# Patient Record
Sex: MALE | Race: WHITE | NOT HISPANIC OR LATINO | URBAN - METROPOLITAN AREA
[De-identification: names, ages, dates, MRNs, and addresses within clinical notes are randomized per-mention and may not be internally consistent; named-entity substitution may affect disease eponyms.]

---

## 2019-09-15 ENCOUNTER — EMERGENCY (EMERGENCY)
Facility: HOSPITAL | Age: 19
LOS: 1 days | Discharge: HOME | End: 2019-09-15
Attending: EMERGENCY MEDICINE
Payer: COMMERCIAL

## 2019-09-15 VITALS
SYSTOLIC BLOOD PRESSURE: 134 MMHG | HEART RATE: 94 BPM | DIASTOLIC BLOOD PRESSURE: 79 MMHG | TEMPERATURE: 99 F | WEIGHT: 169.98 LBS | HEIGHT: 73 IN | OXYGEN SATURATION: 97 % | RESPIRATION RATE: 20 BRPM

## 2019-09-15 LAB
ALBUMIN SERPL ELPH-MCNC: 4.4 G/DL — SIGNIFICANT CHANGE UP (ref 3.3–5)
ALP SERPL-CCNC: 93 U/L — SIGNIFICANT CHANGE UP (ref 40–120)
ALT FLD-CCNC: 115 U/L — HIGH (ref 10–45)
ANION GAP SERPL CALC-SCNC: 13 MMOL/L — SIGNIFICANT CHANGE UP (ref 5–17)
APPEARANCE UR: CLEAR — SIGNIFICANT CHANGE UP
AST SERPL-CCNC: 61 U/L — HIGH (ref 10–40)
BACTERIA # UR AUTO: NEGATIVE — SIGNIFICANT CHANGE UP
BILIRUB SERPL-MCNC: 0.5 MG/DL — SIGNIFICANT CHANGE UP (ref 0.2–1.2)
BILIRUB UR-MCNC: NEGATIVE — SIGNIFICANT CHANGE UP
BUN SERPL-MCNC: 14 MG/DL — SIGNIFICANT CHANGE UP (ref 7–23)
CALCIUM SERPL-MCNC: 10.1 MG/DL — SIGNIFICANT CHANGE UP (ref 8.4–10.5)
CHLORIDE SERPL-SCNC: 102 MMOL/L — SIGNIFICANT CHANGE UP (ref 96–108)
CO2 SERPL-SCNC: 23 MMOL/L — SIGNIFICANT CHANGE UP (ref 22–31)
COLOR SPEC: YELLOW — SIGNIFICANT CHANGE UP
CREAT SERPL-MCNC: 1.11 MG/DL — SIGNIFICANT CHANGE UP (ref 0.5–1.3)
DIFF PNL FLD: NEGATIVE — SIGNIFICANT CHANGE UP
EPI CELLS # UR: 1 /HPF — SIGNIFICANT CHANGE UP
GLUCOSE SERPL-MCNC: 89 MG/DL — SIGNIFICANT CHANGE UP (ref 70–99)
GLUCOSE UR QL: NEGATIVE — SIGNIFICANT CHANGE UP
HCT VFR BLD CALC: 44.6 % — SIGNIFICANT CHANGE UP (ref 39–50)
HGB BLD-MCNC: 15.1 G/DL — SIGNIFICANT CHANGE UP (ref 13–17)
HYALINE CASTS # UR AUTO: 0 /LPF — SIGNIFICANT CHANGE UP (ref 0–2)
KETONES UR-MCNC: NEGATIVE — SIGNIFICANT CHANGE UP
LEUKOCYTE ESTERASE UR-ACNC: NEGATIVE — SIGNIFICANT CHANGE UP
MCHC RBC-ENTMCNC: 29.4 PG — SIGNIFICANT CHANGE UP (ref 27–34)
MCHC RBC-ENTMCNC: 33.9 GM/DL — SIGNIFICANT CHANGE UP (ref 32–36)
MCV RBC AUTO: 86.7 FL — SIGNIFICANT CHANGE UP (ref 80–100)
NITRITE UR-MCNC: NEGATIVE — SIGNIFICANT CHANGE UP
PH UR: 6 — SIGNIFICANT CHANGE UP (ref 5–8)
PLATELET # BLD AUTO: 268 K/UL — SIGNIFICANT CHANGE UP (ref 150–400)
POTASSIUM SERPL-MCNC: 4.2 MMOL/L — SIGNIFICANT CHANGE UP (ref 3.5–5.3)
POTASSIUM SERPL-SCNC: 4.2 MMOL/L — SIGNIFICANT CHANGE UP (ref 3.5–5.3)
PROT SERPL-MCNC: 8.3 G/DL — SIGNIFICANT CHANGE UP (ref 6–8.3)
PROT UR-MCNC: ABNORMAL
RBC # BLD: 5.14 M/UL — SIGNIFICANT CHANGE UP (ref 4.2–5.8)
RBC # FLD: 11.6 % — SIGNIFICANT CHANGE UP (ref 10.3–14.5)
RBC CASTS # UR COMP ASSIST: 1 /HPF — SIGNIFICANT CHANGE UP (ref 0–4)
S PYO AG SPEC QL IA: NEGATIVE — SIGNIFICANT CHANGE UP
SODIUM SERPL-SCNC: 138 MMOL/L — SIGNIFICANT CHANGE UP (ref 135–145)
SP GR SPEC: 1.05 — HIGH (ref 1.01–1.02)
UROBILINOGEN FLD QL: NEGATIVE — SIGNIFICANT CHANGE UP
WBC # BLD: 15.8 K/UL — HIGH (ref 3.8–10.5)
WBC # FLD AUTO: 15.8 K/UL — HIGH (ref 3.8–10.5)
WBC UR QL: 3 /HPF — SIGNIFICANT CHANGE UP (ref 0–5)

## 2019-09-15 PROCEDURE — 70491 CT SOFT TISSUE NECK W/DYE: CPT | Mod: 26

## 2019-09-15 PROCEDURE — 99218: CPT

## 2019-09-15 RX ORDER — DEXAMETHASONE 0.5 MG/5ML
10 ELIXIR ORAL EVERY 8 HOURS
Refills: 0 | Status: COMPLETED | OUTPATIENT
Start: 2019-09-15 | End: 2019-09-16

## 2019-09-15 RX ORDER — SODIUM CHLORIDE 9 MG/ML
1000 INJECTION INTRAMUSCULAR; INTRAVENOUS; SUBCUTANEOUS
Refills: 0 | Status: DISCONTINUED | OUTPATIENT
Start: 2019-09-15 | End: 2019-09-19

## 2019-09-15 RX ORDER — ACETAMINOPHEN 500 MG
650 TABLET ORAL ONCE
Refills: 0 | Status: COMPLETED | OUTPATIENT
Start: 2019-09-15 | End: 2019-09-15

## 2019-09-15 RX ORDER — SODIUM CHLORIDE 9 MG/ML
1000 INJECTION INTRAMUSCULAR; INTRAVENOUS; SUBCUTANEOUS ONCE
Refills: 0 | Status: COMPLETED | OUTPATIENT
Start: 2019-09-15 | End: 2019-09-15

## 2019-09-15 RX ADMIN — Medication 102 MILLIGRAM(S): at 15:52

## 2019-09-15 RX ADMIN — Medication 100 MILLIGRAM(S): at 14:32

## 2019-09-15 RX ADMIN — Medication 125 MILLIGRAM(S): at 14:42

## 2019-09-15 RX ADMIN — SODIUM CHLORIDE 125 MILLILITER(S): 9 INJECTION INTRAMUSCULAR; INTRAVENOUS; SUBCUTANEOUS at 22:15

## 2019-09-15 RX ADMIN — Medication 100 MILLIGRAM(S): at 22:13

## 2019-09-15 RX ADMIN — Medication 650 MILLIGRAM(S): at 14:42

## 2019-09-15 RX ADMIN — SODIUM CHLORIDE 1000 MILLILITER(S): 9 INJECTION INTRAMUSCULAR; INTRAVENOUS; SUBCUTANEOUS at 12:18

## 2019-09-15 NOTE — ED CDU PROVIDER INITIAL DAY NOTE - DETAILS
Adenoiditis  -IVF  -IV antibiotics  -steroids  -pain control  -ENT following  -Frequent eval  Case d/w Dr. Mccarty

## 2019-09-15 NOTE — ED CDU PROVIDER DISPOSITION NOTE - PATIENT PORTAL LINK FT
You can access the FollowMyHealth Patient Portal offered by [facility] by registering at the following website: [email or web address]. By joining [employer]’s FollowMyHealth portal, you will also be able to view your health information using other applications (apps) compatible with our system.

## 2019-09-15 NOTE — ED CDU PROVIDER INITIAL DAY NOTE - ATTENDING CONTRIBUTION TO CARE
I have personally performed a face to face diagnostic evaluation on this patient.  I have reviewed the ACP note and agree with the history, exam, and plan of care, except as noted.  History and Exam by me shows  See ED provider note  Tom Mccarty MD, Facep

## 2019-09-15 NOTE — ED ADULT NURSE NOTE - OBJECTIVE STATEMENT
17 yo presents to the ED from home. A&Ox4, ambulatory c/o R neck swelling x 2-3 weeks. reports pain with swallowing. denies fever, chills. SOB, CP. no resp distress.

## 2019-09-15 NOTE — CONSULT NOTE ADULT - COMMENTS
Vital Signs Last 24 Hrs  T(C): 36.9 (15 Sep 2019 15:49), Max: 38 (15 Sep 2019 14:35)  T(F): 98.4 (15 Sep 2019 15:49), Max: 100.4 (15 Sep 2019 14:35)  HR: 81 (15 Sep 2019 15:49) (81 - 94)  BP: 125/69 (15 Sep 2019 15:49) (125/69 - 139/80)  BP(mean): --  RR: 16 (15 Sep 2019 15:49) (16 - 20)  SpO2: 98% (15 Sep 2019 15:49) (97% - 100%)

## 2019-09-15 NOTE — CONSULT NOTE ADULT - NOSE
inflamed mucosa/clear discharge/nasal/sinus endoscopy: maxillary sinus with mucoid fluid b/l via inferior meatus, ss clear b/l/congestion

## 2019-09-15 NOTE — ED PROVIDER NOTE - CLINICAL SUMMARY MEDICAL DECISION MAKING FREE TEXT BOX
Pain swelling rt neck post pharynx ro pta.lymphadenopathy. Check ct,labs, ENT cs  Tom Mccarty MD, Facep Pain swelling rt neck post pharynx ro pta.lymphadenopathy. Check ct,labs, ENT cs  Discussed with Rads/ENT dx adeonotitis, ENT req iv clinda/steroids and cdu obs.   Tom Mccarty MD, Facep

## 2019-09-15 NOTE — ED CDU PROVIDER INITIAL DAY NOTE - OBJECTIVE STATEMENT
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 + pain with swallowing. Seen by Med office at [facility]. Had ear infection tx with abx earlier in summer. Denies any fever/chills, trouble breathing, CP, cough, abd pain, n/v/d, rash. No other complaints.   In ED, temp 100.4F, vitals otherwise stable. Labs + leukocytosis. LFTs elevated. CT neck + adenoiditis. Pt seen by ENT Dr. Fernandez, recommending clinda IV and steroids and pt to go to CDU for observation of symptom improvement.

## 2019-09-15 NOTE — CONSULT NOTE ADULT - ASSESSMENT
Assessment:  The patient is a 18 year old male with no significant past medial history, who presents to the emergency room at [facility] with a chief complaint of face/throat pain for the past several days.  Ddx; malignance versus more probably Acute Cgane-Nnmqqzoc-vjizblftfjl with Right Acute Lymphadenitis    Plan:  1) clindamycin 900mg IV q8hrs   2) decadron 10mg IV q8hrs x 3 doses  3) CDU  4) home with PO Clindamycin x 7 days

## 2019-09-15 NOTE — ED CDU PROVIDER DISPOSITION NOTE - CLINICAL COURSE
19yo M with no significant PMH, [employer] student, c/o r-sided neck pain/swelling x 2-3 week. + pain with swallowing. Seen by Med office at [facility]. Had ear infection tx with abx earlier in summer. Denies any fever/chills, trouble breathing, CP, cough, abd pain, n/v/d, rash. No other complaints.   In ED, temp 100.4F, vitals otherwise stable. Labs + leukocytosis. LFTs elevated. CT neck + adenoiditis. Pt seen by ENT Dr. Fernandez, recommending clinda IV and steroids and pt to go to CDU for observation of symptom improvement.

## 2019-09-15 NOTE — ED PROVIDER NOTE - OBJECTIVE STATEMENT
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 PT comes to ed complains of RT neck swelling past two three weeks with pain with pain on swallowing. Seen by Med office at [facility]. Had ear infection tx with abx earlier in summer. No fever chills. Sob, cp, cough,abd pain. NVDC, Gi bleeding.  No other swellings.

## 2019-09-15 NOTE — CONSULT NOTE ADULT - ENMT COMMENTS
Flexible Fibeoptic Laryngoscopy: clear nasopharynx to glottis, +pus in nasopharynx with enlarged adenoids, clear pharynx, tvc mobile b/l, airway patent

## 2019-09-15 NOTE — ED CDU PROVIDER DISPOSITION NOTE - ATTENDING CONTRIBUTION TO CARE
gatito food / feeling better  well appearing / clear neck swelling  dx w mono - unlikely additional dx. dc w f/u tx per ent

## 2019-09-15 NOTE — ED CDU PROVIDER INITIAL DAY NOTE - PROGRESS NOTE DETAILS
D/w ENT SOTERO Young who spoke with Dr. Fernandez, recommending to start decadron 10mg q8 x 24hrs, aware that patient received solumedrol dose in ED. Patient resting comfortably, NAD. Reports he is feeling well. Tolerating food/fluid. VSS. CDU PROGRESS NOTE SOTERO COSTA: Received pt 1900 sign-out. Pt resting in stretcher in NAD. Case/plan reviewed. VSS. HEENT mild erythema posterior pharynx. Uvula midline. No stridor or drooling, Pt without complaints. Will continue to monitor overnight. CDU PROGRESS NOTE PA JULISSA: Pt resting comfortably, feeling well without complaint. NAD, VSS.  HEENT mild erythema posterior pharynx. Uvula midline. No stridor or drooling, Tolerating PO

## 2019-09-15 NOTE — ED CDU PROVIDER DISPOSITION NOTE - NSFOLLOWUPINSTRUCTIONS_ED_ALL_ED_FT
1. Follow up with your doctor in 2 days.   2. Keep self well hydrated. Motrin 600 mg every 6 hours with food. Warm salt water gargles 3-4xday in the day. soft diet. Complete the medrol dose pack as prescribed. AVOID ANY CONTACT SPORTS UNTIL CLEARED BY YOUR DOCTOR.   3. Return for high fever, chills, vomiting, unable to swallow, neck pain or any other concerns.

## 2019-09-15 NOTE — CHART NOTE - NSCHARTNOTEFT_GEN_A_CORE
s [facility]  Head & Neck Surgery Procedure Note      Name:                  Yamil Sheffield	               Surgeon:   Mainor Fernandez MD	  				  Date of Procedure: 09/15/2019                        Assistant:  None    Record Number:	[id number]                              Anesthesia:  Topical Anesthesia       Preoperative diagnosis:	Dysphagia, unspecified (R13.10)  	  Postoperative diagnosis:	Dysphagia, unspecified (R13.10)    Procedure:		Flexible Fiberoptic Laryngoscopy  (49257)    INDICATION:  The patient is a 18 year old male with no significant past medial history, who presents to the emergency room at [facility] with a chief complaint of face/throat pain for the past several days. Patient with right neck swelling past two three weeks with pain with pain on swallowing. Patient with chronic sinusitis with postnasal drip. Had ear infection tx with abx earlier in summer. No fever chills. Sob, cp, cough,abd pain. NVDC, Gi bleeding.  No other swellings.    PROCEDURE: The patient was seen and his bilateral nasal cavities were prepped and sprayed with topical anesthesia of neosynephrine.   Following this, a fiberoptic flexible laryngoscope was passed into the left nasal cavity, and then slowly and meticulously moved posteriorly to the nasopharynx.  There was no evidence of clotted blood within the left anterior and posterior superior lateral nasal wall. There was clear mucous within the nasal cavity.  Once at nasopharynx the skull base and lateral walls of the eustachian tubes were examined for lesions and masses.  There was no blood within the nasopharynx. There was mild prominence of the nasopharyngeal soft tissue with pus consistent with adenoiditis.  The fiberoptic endoscope was then carefully directed inferiorly and moved to the hypopharynx and glottis.  There was no fullness of the base of tongue.  There was 1-2+ prominence of the tonsils bilaterally (equally) and without exudate. There was no evidence of retropharyngeal erythema or edema.  There was mild  diffuse erythema  of the pharyngeal wall consistent with acute pharyngitis and adenoiditis.  The true vocal cords appeared to be mobile bilaterally.  There was no evidence of foreign body or pooling of secretions, or obvious aspiration or penetration of liquid into the glottis.  The airway was widely patent. The patient tolerated the procedure well..

## 2019-09-15 NOTE — ED CDU PROVIDER DISPOSITION NOTE - PLAN OF CARE
Take Clindamycin for 7 days as prescribed.   Follow up with your Primary Care Physician within the next 2-3 days  Bring a copy of your test results with you to your appointment  Return to the Emergency Room if you experience new or worsening symptoms

## 2019-09-15 NOTE — CONSULT NOTE ADULT - SUBJECTIVE AND OBJECTIVE BOX
HPI: The patient is a 18 year old male with no significant past medial history, who presents to the emergency room at [facility] with a chief complaint of face/throat pain for the past several days. Patient with right neck swelling past two three weeks with pain with pain on swallowing. Patient with chronic sinusitis with postnasal drip. Had ear infection tx with abx earlier in summer. No fever chills. Sob, cp, cough,abd pain. NVDC, Gi bleeding.  No other swellings.	     HIV Status:  · Offered: Declined	    PAST MEDICAL/SURGICAL/FAMILY/SOCIAL HISTORY:    Past Medical History:  No pertinent past medical history.     Past Surgical History:  No significant past surgical history.     Tobacco Usage:  · Tobacco Usage	Never smoker	    ALLERGIES AND HOME MEDICATIONS:   Allergies:        Allergies:  	shellfish: Food, Hives, Rowan, Rolaine  	Drug Allergies Not Recorded:     Home Medications:   * Outpatient Medication Status not yet specified    LABS:  CBC Full  -  ( 15 Sep 2019 12:15 )  WBC Count : 15.8 K/uL  Hemoglobin : 15.1 g/dL  Hematocrit : 44.6 %  Platelet Count - Automated : 268 K/uL  Mean Cell Volume : 86.7 fl  Mean Cell Hemoglobin : 29.4 pg  Mean Cell Hemoglobin Concentration : 33.9 gm/dL  Auto Neutrophil # : x  Auto Lymphocyte # : x  Auto Monocyte # : x  Auto Eosinophil # : x  Auto Basophil # : x  Auto Neutrophil % : x  Auto Lymphocyte % : x  Auto Monocyte % : x  Auto Eosinophil % : x  Auto Basophil % : x    --------------------------------------------------------------------------------------------------------  EXAM:  CT NECK SOFT TISSUE IC                        FINDINGS:  The adenoids are markedly enlarged and demonstrate somewhat striated   enhancement. Findings are suspicious for the presence of adenoiditis.  Enlargement of the bilateral palatine tonsils. Somewhat enlarged lingual   tonsils.  Soft tissue surrounding the hypopharynx and larynx are unremarkable. No   focal lesion within the thyroid gland, submandibular glands, or parotid   glands.    IMPRESSION:  Adenoids markedly enlarged and demonstrate somewhat striated enhancement,   suspicious for the presence of adenoiditis.  Enlarged bilateral palatine tonsils. Somewhat enlarged lingual tonsils.  Lymphadenopathy as described, likely reactive in nature.  ------------------------------------------------------------------------------------------------------

## 2019-09-15 NOTE — CHART NOTE - NSCHARTNOTEFT_GEN_A_CORE
s [facility]  Head & Neck Surgery Procedure Note    Name:                  Yamil Sheffield	               Surgeon:   Mainor Fernandez MD	  				  Date of Procedure: 09/15/2019                        Assistant:  None    Record Number:	[id number]                              Anesthesia:  Topical Anesthesia     Preoperative diagnosis:	Acute maxillary sinusitis, unspecified (J01.00);  Acute Pansinusitis (J01.40)    Postoperative diagnosis:	Same    Procedure:	              Bilateral maxillary sinus endoscopy  (60103)                                            Diagnostic Sphenoid Sinus Endoscopy (10359)      INDICATION:  The patient is a 18 year old male with no significant past medial history, who presents to the emergency room at [facility] with a chief complaint of face/throat pain for the past several days. Patient with right neck swelling past two three weeks with pain with pain on swallowing. Patient with chronic sinusitis with postnasal drip. Had ear infection tx with abx earlier in summer. No fever chills. Sob, cp, cough,abd pain. NVDC, Gi bleeding.  No other swellings.    PROCEDURE:  The patient was seen and his nasal cavities were prepped and sprayed with topical neosynephrine anesthesia.   Following this, a zero degree flexible endoscope was passed into the left nasal vault, and passed posteriorly to the nasopharynx.  There was no evidence of any blood emanating from the left posterior superior lateral nasal wall. The scope was then slowly withdrawn and then rotated superiorly to visualize the middle turbinate and the inferior meatus and osteomeatal complex. The OMC on the left side appeared patent with no evidence of pus or obstruction.  The Maxillary sinus on the left side was then accessed through the inferior meatus.  The maxillary sinus had mild to moderate amount of mucoserous fluid within it without any evidence of masses or lesions.  The frontal duct was then inspected and appeared clear without any mucoid material flowing from it.  The Septum appeared straight.  Next the endoscope was passed posteriorly to the sphenoid sinus. The sphenoid sinus was identified 30 degrees up from the nasal floor and approximately 6cm posterior to the nasal sill. The left sphenoid sinus was entered and had no evidence of purulence or masses. The scope was then slowly withdrawn.  The zero degree endoscope was then introduced into the right nasal cavity and passed posteriorly to the nasopharynx. There were no masses visible.  There was no evidence of any bleeding emanating from the right posterior septum.  The endoscope was then rotated superiorly to visualize the middle turbinate and the inferior meatus and osteomeatal complex. The Maxillary sinus on the right side was then accessed via the inferior meatus.  There was a minimal amount of mucoserous fluid within the maxillary sinus with no evidence of any masses or pus.  Again the septum appeared to be straight.  The scope was then passed posteriorly to the sphenoid sinus. The right sphenoid sinus was entered and had a minimal amount of mucoserous material within it.  The scope was then withdrawn.  The patient tolerated the procedure well.  There were no areas of telangiectasia along the anterior septum.  The scope was then withdrawn.  The patient tolerated the procedure well.

## 2019-09-16 VITALS
TEMPERATURE: 98 F | OXYGEN SATURATION: 98 % | RESPIRATION RATE: 18 BRPM | SYSTOLIC BLOOD PRESSURE: 127 MMHG | DIASTOLIC BLOOD PRESSURE: 71 MMHG | HEART RATE: 60 BPM

## 2019-09-16 DIAGNOSIS — J35.3 HYPERTROPHY OF TONSILS WITH HYPERTROPHY OF ADENOIDS: ICD-10-CM

## 2019-09-16 LAB
ALBUMIN SERPL ELPH-MCNC: 4 G/DL — SIGNIFICANT CHANGE UP (ref 3.3–5)
ALP SERPL-CCNC: 86 U/L — SIGNIFICANT CHANGE UP (ref 40–120)
ALT FLD-CCNC: 95 U/L — HIGH (ref 10–45)
ANION GAP SERPL CALC-SCNC: 11 MMOL/L — SIGNIFICANT CHANGE UP (ref 5–17)
AST SERPL-CCNC: 43 U/L — HIGH (ref 10–40)
BASOPHILS # BLD AUTO: 0 K/UL — SIGNIFICANT CHANGE UP (ref 0–0.2)
BASOPHILS NFR BLD AUTO: 0.1 % — SIGNIFICANT CHANGE UP (ref 0–2)
BILIRUB SERPL-MCNC: 0.3 MG/DL — SIGNIFICANT CHANGE UP (ref 0.2–1.2)
BUN SERPL-MCNC: 13 MG/DL — SIGNIFICANT CHANGE UP (ref 7–23)
CALCIUM SERPL-MCNC: 9 MG/DL — SIGNIFICANT CHANGE UP (ref 8.4–10.5)
CHLORIDE SERPL-SCNC: 106 MMOL/L — SIGNIFICANT CHANGE UP (ref 96–108)
CO2 SERPL-SCNC: 22 MMOL/L — SIGNIFICANT CHANGE UP (ref 22–31)
CREAT SERPL-MCNC: 0.74 MG/DL — SIGNIFICANT CHANGE UP (ref 0.5–1.3)
EBV EA AB SER IA-ACNC: >150 U/ML — HIGH
EBV EA AB TITR SER IF: NEGATIVE — SIGNIFICANT CHANGE UP
EBV EA IGG SER-ACNC: POSITIVE
EBV NA IGG SER IA-ACNC: <3 U/ML — SIGNIFICANT CHANGE UP
EBV PATRN SPEC IB-IMP: SIGNIFICANT CHANGE UP
EBV VCA IGG AVIDITY SER QL IA: POSITIVE
EBV VCA IGM SER IA-ACNC: 105 U/ML — HIGH
EBV VCA IGM SER IA-ACNC: >160 U/ML — HIGH
EBV VCA IGM TITR FLD: POSITIVE
EOSINOPHIL # BLD AUTO: 0 K/UL — SIGNIFICANT CHANGE UP (ref 0–0.5)
EOSINOPHIL NFR BLD AUTO: 0 % — SIGNIFICANT CHANGE UP (ref 0–6)
GLUCOSE SERPL-MCNC: 162 MG/DL — HIGH (ref 70–99)
HCT VFR BLD CALC: 40.1 % — SIGNIFICANT CHANGE UP (ref 39–50)
HETEROPH AB TITR SER AGGL: POSITIVE
HGB BLD-MCNC: 14.1 G/DL — SIGNIFICANT CHANGE UP (ref 13–17)
LYMPHOCYTES # BLD AUTO: 43.2 % — SIGNIFICANT CHANGE UP (ref 13–44)
LYMPHOCYTES # BLD AUTO: 5.6 K/UL — HIGH (ref 1–3.3)
MCHC RBC-ENTMCNC: 30.5 PG — SIGNIFICANT CHANGE UP (ref 27–34)
MCHC RBC-ENTMCNC: 35.2 GM/DL — SIGNIFICANT CHANGE UP (ref 32–36)
MCV RBC AUTO: 86.4 FL — SIGNIFICANT CHANGE UP (ref 80–100)
MONOCYTES # BLD AUTO: 0.8 K/UL — SIGNIFICANT CHANGE UP (ref 0–0.9)
MONOCYTES NFR BLD AUTO: 6.4 % — SIGNIFICANT CHANGE UP (ref 2–14)
NEUTROPHILS # BLD AUTO: 6.4 K/UL — SIGNIFICANT CHANGE UP (ref 1.8–7.4)
NEUTROPHILS NFR BLD AUTO: 50.3 % — SIGNIFICANT CHANGE UP (ref 43–77)
PLATELET # BLD AUTO: 257 K/UL — SIGNIFICANT CHANGE UP (ref 150–400)
POTASSIUM SERPL-MCNC: 4.3 MMOL/L — SIGNIFICANT CHANGE UP (ref 3.5–5.3)
POTASSIUM SERPL-SCNC: 4.3 MMOL/L — SIGNIFICANT CHANGE UP (ref 3.5–5.3)
PROT SERPL-MCNC: 7.4 G/DL — SIGNIFICANT CHANGE UP (ref 6–8.3)
RBC # BLD: 4.64 M/UL — SIGNIFICANT CHANGE UP (ref 4.2–5.8)
RBC # FLD: 11.9 % — SIGNIFICANT CHANGE UP (ref 10.3–14.5)
SODIUM SERPL-SCNC: 139 MMOL/L — SIGNIFICANT CHANGE UP (ref 135–145)
WBC # BLD: 12.8 K/UL — HIGH (ref 3.8–10.5)
WBC # FLD AUTO: 12.8 K/UL — HIGH (ref 3.8–10.5)

## 2019-09-16 PROCEDURE — G0378: CPT

## 2019-09-16 PROCEDURE — 86664 EPSTEIN-BARR NUCLEAR ANTIGEN: CPT

## 2019-09-16 PROCEDURE — 70491 CT SOFT TISSUE NECK W/DYE: CPT

## 2019-09-16 PROCEDURE — 96375 TX/PRO/DX INJ NEW DRUG ADDON: CPT | Mod: XU

## 2019-09-16 PROCEDURE — 96376 TX/PRO/DX INJ SAME DRUG ADON: CPT | Mod: XU

## 2019-09-16 PROCEDURE — 99217: CPT

## 2019-09-16 PROCEDURE — 86665 EPSTEIN-BARR CAPSID VCA: CPT

## 2019-09-16 PROCEDURE — 85027 COMPLETE CBC AUTOMATED: CPT

## 2019-09-16 PROCEDURE — 81001 URINALYSIS AUTO W/SCOPE: CPT

## 2019-09-16 PROCEDURE — 87081 CULTURE SCREEN ONLY: CPT

## 2019-09-16 PROCEDURE — 80053 COMPREHEN METABOLIC PANEL: CPT

## 2019-09-16 PROCEDURE — 99284 EMERGENCY DEPT VISIT MOD MDM: CPT | Mod: 25

## 2019-09-16 PROCEDURE — 96374 THER/PROPH/DIAG INJ IV PUSH: CPT | Mod: XU

## 2019-09-16 PROCEDURE — 87880 STREP A ASSAY W/OPTIC: CPT

## 2019-09-16 PROCEDURE — 86308 HETEROPHILE ANTIBODY SCREEN: CPT

## 2019-09-16 PROCEDURE — 86663 EPSTEIN-BARR ANTIBODY: CPT

## 2019-09-16 RX ADMIN — Medication 102 MILLIGRAM(S): at 09:04

## 2019-09-16 RX ADMIN — Medication 100 MILLIGRAM(S): at 06:04

## 2019-09-16 RX ADMIN — Medication 102 MILLIGRAM(S): at 00:18

## 2019-09-16 RX ADMIN — SODIUM CHLORIDE 125 MILLILITER(S): 9 INJECTION INTRAMUSCULAR; INTRAVENOUS; SUBCUTANEOUS at 06:04

## 2019-09-16 NOTE — PROGRESS NOTE ADULT - PROBLEM SELECTOR PLAN 1
PO hydration, fluids  Pain control  Supportive care  Outpatient F/U with Dr. Fernandez at ENT clinic at [facility] [phone]

## 2019-09-16 NOTE — PROGRESS NOTE ADULT - ASSESSMENT
18 year old male with  Acute Mdnea-Gwrxyvho-wtdsfxrrykn with Right Acute Lymphadenitis secondary to EBV.

## 2019-09-16 NOTE — PROGRESS NOTE ADULT - SUBJECTIVE AND OBJECTIVE BOX
POD/STATUS POST/ENT ISSUE:     INTERVAL HPI: 18 year old male with no significant past medial history, who presents to the emergency room at [facility] with a chief complaint of face/throat pain for the past several days. Patient with right neck swelling past two three weeks with pain with pain on swallowing. Patient with chronic sinusitis with postnasal drip. Had ear infection tx with abx earlier in summer.    Vital Signs Last 24 Hrs  T(C): 36.5 (16 Sep 2019 04:07), Max: 38 (15 Sep 2019 14:35)  T(F): 97.7 (16 Sep 2019 04:07), Max: 100.4 (15 Sep 2019 14:35)  HR: 71 (16 Sep 2019 04:07) (70 - 94)  BP: 113/65 (16 Sep 2019 04:07) (109/65 - 139/80)  BP(mean): --  RR: 18 (16 Sep 2019 04:07) (16 - 20)  SpO2: 97% (16 Sep 2019 04:07) (97% - 100%)    PHYSICAL EXAM:  Gen: NAD, well-developed  Head: Normocephalic, Atraumatic  Eyes: PERRL, EOMI, no scleral injection  Nose: Nares bilaterally patent, no discharge  Mouth: Mucosa moist, tongue/uvula midline, oropharynx clear  Neck: Flat, supple, no lymphadenopathy, trachea midline, no masses  Resp: breathing easily, no stridor  CV: no peripheral edema/cyanosis    LABS:                        14.1   12.8  )-----------( 257      ( 16 Sep 2019 05:55 )             40.1       IMAGING/ADDITIONAL STUDIES: POD/STATUS POST/ENT ISSUE:     INTERVAL HPI: 18 year old male with no significant past medial history, who presents to the emergency room at [facility] with a chief complaint of face/throat pain for the past several days. Patient with right neck swelling past two three weeks with pain with pain on swallowing. Patient with chronic sinusitis with postnasal drip. Had ear infection tx with abx earlier in summer.    Vital Signs Last 24 Hrs  T(C): 36.5 (16 Sep 2019 04:07), Max: 38 (15 Sep 2019 14:35)  T(F): 97.7 (16 Sep 2019 04:07), Max: 100.4 (15 Sep 2019 14:35)  HR: 71 (16 Sep 2019 04:07) (70 - 94)  BP: 113/65 (16 Sep 2019 04:07) (109/65 - 139/80)  BP(mean): --  RR: 18 (16 Sep 2019 04:07) (16 - 20)  SpO2: 97% (16 Sep 2019 04:07) (97% - 100%)    PHYSICAL EXAM:  Gen: NAD, well-developed  Head: Normocephalic, Atraumatic  Eyes: PERRL, EOMI, no scleral injection  Nose: Nares bilaterally patent, no discharge  Mouth: Mucosa moist, tongue/uvula midline, oropharynx -tonsillar edema left>right. No exudates  Neck: Flat, supple, no lymphadenopathy, trachea midline, no masses  Resp: breathing easily, no stridor  CV: no peripheral edema/cyanosis    LABS:                        14.1   12.8  )-----------( 257      ( 16 Sep 2019 05:55 )             40.1       IMAGING/ADDITIONAL STUDIES:

## 2019-09-16 NOTE — ED CDU PROVIDER SUBSEQUENT DAY NOTE - PROGRESS NOTE DETAILS
Pt resting comfortably. NAD. No complaints. VSS. +EBV seen by ENT- no need for abx, can go home on medrol dose pack with Mono precautions. we discussed mono precautions in the mma.

## 2019-09-16 NOTE — ED ADULT NURSE REASSESSMENT NOTE - NS ED NURSE REASSESS COMMENT FT1
Pt report received from Eden LEE. Pt transferred to cdu Bed 6 for IV antibiotics for adenoiditis. Pt a/ox3 denies respiratory distress, sob, dyspnea, C/P, palpitations, n/v/d at this time. Pt states symptoms have improved,. VSS, afebrile, IV clean/dry/intact. Pt aware of plan of care, call bell within reach ,safety maintained. Will monitor and assess.
Report taken from Kayla LEE. states pt have a good night with no complaints. Will continue to monitor.
Pt received from ROSA Sahu. Pt oriented to CDU & plan of care was discussed. Pt A&O x 3. Pt in CDU for IVF, IV abx, steroids, and pain control. Pt denies any neck pain, difficulty swallowing, or difficulty breathing at this time. Pt has swelling to right side of his neck. Pt states he feels much better than he did. Safety & comfort measures maintained. Call bell in reach. Will continue to monitor.

## 2019-09-16 NOTE — ED CDU PROVIDER SUBSEQUENT DAY NOTE - HISTORY
CDU PROGRESS NOTE PA JULISSA: Pt resting comfortably, feeling well without complaint. NAD, VSS. HEENT mild erythema posterior pharynx. Uvula midline. No stridor or drooling, Tolerating PO.

## 2019-09-17 NOTE — ED POST DISCHARGE NOTE - RESULT SUMMARY
ebv - mono screen ebv - mono screen  // 9/20/19: Throat Cx numerous Arcanobacterium haemolyticum susceptibilities not performed, no GAS- pt tx with clinda in CDU and sent home with  PO Cleocin, abx should be appropriate but would call to relay information and determine inquire about sxs.

## 2019-09-17 NOTE — ED POST DISCHARGE NOTE - DETAILS
Left voicemail informing that I had results to discuss with the patient, gave call back number. -Mazin Meng PA-C pt called back, informed of results, states throat feels the same as time of DC denies f/c, has f/u with ENT next tuesday 9/24. Advised to return to ED if f/c, dysphagia, dyspnea, or any concerns. -Mazin Meng PA-C

## 2022-11-22 NOTE — ED CDU PROVIDER INITIAL DAY NOTE - PSYCHIATRIC, MLM
Male
Alert and oriented to person, place, time/situation. normal mood and affect. no apparent risk to self or others.

## 2025-06-25 NOTE — ED ADULT NURSE NOTE - PRIMARY CARE PROVIDER
Received a PA request from Dipesh Islas for cyclobenzaprine (FLEXERIL) 10 MG tablet. Submitted on CMM. Waiting for a response.   
unk